# Patient Record
Sex: FEMALE | Race: BLACK OR AFRICAN AMERICAN | NOT HISPANIC OR LATINO | Employment: FULL TIME | URBAN - METROPOLITAN AREA
[De-identification: names, ages, dates, MRNs, and addresses within clinical notes are randomized per-mention and may not be internally consistent; named-entity substitution may affect disease eponyms.]

---

## 2023-12-12 ENCOUNTER — ANCILLARY PROCEDURE (OUTPATIENT)
Dept: GENERAL RADIOLOGY | Facility: CLINIC | Age: 48
End: 2023-12-12
Attending: PHYSICIAN ASSISTANT
Payer: COMMERCIAL

## 2023-12-12 ENCOUNTER — OFFICE VISIT (OUTPATIENT)
Dept: URGENT CARE | Facility: URGENT CARE | Age: 48
End: 2023-12-12
Payer: COMMERCIAL

## 2023-12-12 VITALS
SYSTOLIC BLOOD PRESSURE: 97 MMHG | RESPIRATION RATE: 20 BRPM | HEART RATE: 78 BPM | TEMPERATURE: 98 F | DIASTOLIC BLOOD PRESSURE: 87 MMHG | OXYGEN SATURATION: 98 %

## 2023-12-12 DIAGNOSIS — R05.1 ACUTE COUGH: ICD-10-CM

## 2023-12-12 DIAGNOSIS — J22 LRTI (LOWER RESPIRATORY TRACT INFECTION): Primary | ICD-10-CM

## 2023-12-12 DIAGNOSIS — J02.9 SORE THROAT: ICD-10-CM

## 2023-12-12 LAB — DEPRECATED S PYO AG THROAT QL EIA: NEGATIVE

## 2023-12-12 PROCEDURE — 71046 X-RAY EXAM CHEST 2 VIEWS: CPT | Mod: TC | Performed by: RADIOLOGY

## 2023-12-12 PROCEDURE — 87651 STREP A DNA AMP PROBE: CPT | Performed by: PHYSICIAN ASSISTANT

## 2023-12-12 PROCEDURE — 99204 OFFICE O/P NEW MOD 45 MIN: CPT | Performed by: PHYSICIAN ASSISTANT

## 2023-12-12 RX ORDER — BENZONATATE 200 MG/1
200 CAPSULE ORAL 3 TIMES DAILY PRN
Qty: 30 CAPSULE | Refills: 0 | Status: SHIPPED | OUTPATIENT
Start: 2023-12-12

## 2023-12-12 RX ORDER — AZITHROMYCIN 250 MG/1
TABLET, FILM COATED ORAL
Qty: 6 TABLET | Refills: 0 | Status: SHIPPED | OUTPATIENT
Start: 2023-12-12 | End: 2023-12-17

## 2023-12-12 RX ORDER — PREDNISONE 20 MG/1
20 TABLET ORAL 2 TIMES DAILY
Qty: 10 TABLET | Refills: 0 | Status: SHIPPED | OUTPATIENT
Start: 2023-12-12 | End: 2023-12-17

## 2023-12-12 RX ORDER — CODEINE PHOSPHATE AND GUAIFENESIN 10; 100 MG/5ML; MG/5ML
1-2 SOLUTION ORAL
Qty: 50 ML | Refills: 0 | Status: SHIPPED | OUTPATIENT
Start: 2023-12-12

## 2023-12-13 LAB — GROUP A STREP BY PCR: NOT DETECTED

## 2023-12-13 NOTE — PROGRESS NOTES
SUBJECTIVE:   Donato Ferraro is a 48 year old female presenting with a chief complaint of cough - non-productive and wheezing.  Onset of symptoms was 3 day(s) ago.  Course of illness is worsening.    Severity moderate  Current and Associated symptoms: as above  Treatment measures tried include Fluids and Rest.  Predisposing factors include None.    Past Medical History:   Diagnosis Date    NO ACTIVE PROBLEMS     Tumors of body of uterus, antepartum condition or complication 3/08    largest posterior 5.6 cm     Current Outpatient Medications   Medication Sig Dispense Refill    PRENATAL VITAMIN TABS   OR ONE DAILY (Patient not taking: Reported on 12/12/2023) 3 MONTHS 1 YEAR     Social History     Tobacco Use    Smoking status: Never    Smokeless tobacco: Not on file   Substance Use Topics    Alcohol use: No       ROS:  Review of systems negative except as stated above.    OBJECTIVE:  BP 97/87   Pulse 78   Temp 98  F (36.7  C) (Tympanic)   Resp 20   SpO2 98%   GENERAL APPEARANCE: healthy, alert and no distress  EYES: EOMI,  PERRL, conjunctiva clear  HENT: ear canals and TM's normal.  Nose and mouth without ulcers, erythema or lesions  NECK: supple, nontender, no lymphadenopathy  RESP: expiratory wheezes, lungs otherwise clear to auscultation - no rales, rhonchi   CV: regular rates and rhythm, normal S1 S2, no murmur noted  NEURO: Normal strength and tone, sensory exam grossly normal,  normal speech and mentation  SKIN: no suspicious lesions or rashes      X-ray image initially interpreted in clinic by me in order to rule out pneumonia.  No evidence appreciated.      Results for orders placed or performed in visit on 12/12/23   Streptococcus A Rapid Screen w/Reflex to PCR     Status: Normal    Specimen: Throat; Swab   Result Value Ref Range    Group A Strep antigen Negative Negative     '  ASSESSMENT:  (J22) LRTI (lower respiratory tract infection)  (primary encounter diagnosis)  Plan: predniSONE (DELTASONE) 20 MG  tablet,         azithromycin (ZITHROMAX) 250 MG tablet,         benzonatate (TESSALON) 200 MG capsule,         guaiFENesin-codeine (ROBITUSSIN AC) 100-10         MG/5ML solution        (J02.9) Sore throat  Plan: Streptococcus A Rapid Screen w/Reflex to PCR,         Group A Streptococcus PCR Throat Swab       (R05.1) Acute cough  Plan: XR Chest 2 Views

## 2024-12-26 ENCOUNTER — ANCILLARY PROCEDURE (OUTPATIENT)
Dept: GENERAL RADIOLOGY | Facility: CLINIC | Age: 49
End: 2024-12-26
Attending: FAMILY MEDICINE
Payer: COMMERCIAL

## 2024-12-26 ENCOUNTER — OFFICE VISIT (OUTPATIENT)
Dept: URGENT CARE | Facility: URGENT CARE | Age: 49
End: 2024-12-26
Payer: COMMERCIAL

## 2024-12-26 VITALS
SYSTOLIC BLOOD PRESSURE: 121 MMHG | TEMPERATURE: 97.3 F | WEIGHT: 135.5 LBS | HEART RATE: 101 BPM | OXYGEN SATURATION: 99 % | DIASTOLIC BLOOD PRESSURE: 71 MMHG | RESPIRATION RATE: 14 BRPM

## 2024-12-26 DIAGNOSIS — R05.1 ACUTE COUGH: ICD-10-CM

## 2024-12-26 DIAGNOSIS — H10.31 ACUTE CONJUNCTIVITIS OF RIGHT EYE, UNSPECIFIED ACUTE CONJUNCTIVITIS TYPE: ICD-10-CM

## 2024-12-26 DIAGNOSIS — R05.1 ACUTE COUGH: Primary | ICD-10-CM

## 2024-12-26 RX ORDER — POLYMYXIN B SULFATE AND TRIMETHOPRIM 1; 10000 MG/ML; [USP'U]/ML
1-2 SOLUTION OPHTHALMIC 3 TIMES DAILY
Qty: 10 ML | Refills: 0 | Status: SHIPPED | OUTPATIENT
Start: 2024-12-26 | End: 2024-12-31

## 2024-12-26 RX ORDER — BENZONATATE 100 MG/1
100-200 CAPSULE ORAL 3 TIMES DAILY PRN
Qty: 40 CAPSULE | Refills: 0 | Status: SHIPPED | OUTPATIENT
Start: 2024-12-26

## 2024-12-26 NOTE — PROGRESS NOTES
(R05.1) Acute cough  (primary encounter diagnosis)  Comment: melva viral. Symptomatic therapy and follow up discussed.   Plan: XR Chest 2 Views, benzonatate (TESSALON) 100 MG        capsule             (H10.31) Acute conjunctivitis of right eye, unspecified acute conjunctivitis type  Comment:    Plan: polymixin b-trimethoprim (POLYTRIM) 46856-2.1         UNIT/ML-% ophthalmic solution           -------------------------------  Tizicammy Ferraro with presents with 7 days symptoms including sore throat, congestion, productive cough. Occ blood in sputurm. No fever. No shortness of breath. Now eye red as well.     Treatment measures tried include Tylenol/Ibuprofen.  Exposures--no  Recent travel--no    Current Outpatient Medications   Medication Sig Dispense Refill    benzonatate (TESSALON) 100 MG capsule Take 1-2 capsules (100-200 mg) by mouth 3 times daily as needed for cough. 40 capsule 0    polymixin b-trimethoprim (POLYTRIM) 74536-2.1 UNIT/ML-% ophthalmic solution Place 1-2 drops into the right eye 3 times daily for 5 days. 10 mL 0       ROS otherwise negative for resp., ID,  HEENT symptoms.    Objective: /71   Pulse 101   Temp 97.3  F (36.3  C) (Tympanic)   Resp 14   Wt 61.5 kg (135 lb 8 oz)   SpO2 99%   Exam:  GENERAL APPEARANCE: healthy, alert and no distress  EYES: right conjunctival erythematous. Minimal discharge.  HENT: ear canals and TM's normal and nose and mouth without ulcers or lesions  NECK: no adenopathy, no asymmetry, masses, or scars and thyroid normal to palpation  RESP: lungs clear to auscultation - no rales, rhonchi or wheezes  CV: regular rates and rhythm, no murmur    Results for orders placed or performed in visit on 12/26/24   XR Chest 2 Views     Status: None (Preliminary result)    Narrative    CHEST TWO VIEWS  12/26/2024 11:06 AM     HISTORY: Acute cough.    COMPARISON: 12/12/2023.      Impression    IMPRESSION: No acute cardiopulmonary disease.

## 2025-09-04 DIAGNOSIS — D35.2 PITUITARY MICROADENOMA (H): Primary | ICD-10-CM
